# Patient Record
Sex: MALE | Race: WHITE | NOT HISPANIC OR LATINO | Employment: FULL TIME | URBAN - METROPOLITAN AREA
[De-identification: names, ages, dates, MRNs, and addresses within clinical notes are randomized per-mention and may not be internally consistent; named-entity substitution may affect disease eponyms.]

---

## 2020-04-23 ENCOUNTER — HOSPITAL ENCOUNTER (EMERGENCY)
Facility: MEDICAL CENTER | Age: 45
End: 2020-04-23
Attending: EMERGENCY MEDICINE

## 2020-04-23 VITALS
BODY MASS INDEX: 25.88 KG/M2 | OXYGEN SATURATION: 96 % | TEMPERATURE: 97.9 F | RESPIRATION RATE: 18 BRPM | SYSTOLIC BLOOD PRESSURE: 120 MMHG | HEART RATE: 60 BPM | DIASTOLIC BLOOD PRESSURE: 76 MMHG | WEIGHT: 180.78 LBS | HEIGHT: 70 IN

## 2020-04-23 DIAGNOSIS — H18.892 CORNEAL RUST RING OF LEFT EYE: ICD-10-CM

## 2020-04-23 DIAGNOSIS — T15.02XA FOREIGN BODY OF LEFT CORNEA, INITIAL ENCOUNTER: ICD-10-CM

## 2020-04-23 PROCEDURE — 700101 HCHG RX REV CODE 250: Performed by: EMERGENCY MEDICINE

## 2020-04-23 PROCEDURE — 99284 EMERGENCY DEPT VISIT MOD MDM: CPT

## 2020-04-23 PROCEDURE — 65222 REMOVE FOREIGN BODY FROM EYE: CPT

## 2020-04-23 RX ORDER — ERYTHROMYCIN 5 MG/G
OINTMENT OPHTHALMIC
Qty: 1 TUBE | Refills: 0 | Status: SHIPPED | OUTPATIENT
Start: 2020-04-23

## 2020-04-23 RX ORDER — ERYTHROMYCIN 5 MG/G
OINTMENT OPHTHALMIC ONCE
Status: COMPLETED | OUTPATIENT
Start: 2020-04-23 | End: 2020-04-23

## 2020-04-23 RX ORDER — PROPARACAINE HYDROCHLORIDE 5 MG/ML
2 SOLUTION/ DROPS OPHTHALMIC ONCE
Status: COMPLETED | OUTPATIENT
Start: 2020-04-23 | End: 2020-04-23

## 2020-04-23 RX ADMIN — PROPARACAINE HYDROCHLORIDE 2 DROP: 5 SOLUTION/ DROPS OPHTHALMIC at 20:45

## 2020-04-23 RX ADMIN — ERYTHROMYCIN: 5 OINTMENT OPHTHALMIC at 21:15

## 2020-04-23 RX ADMIN — FLUORESCEIN SODIUM 1 MG: 1 STRIP OPHTHALMIC at 20:45

## 2020-04-23 ASSESSMENT — LIFESTYLE VARIABLES
EVER HAD A DRINK FIRST THING IN THE MORNING TO STEADY YOUR NERVES TO GET RID OF A HANGOVER: NO
HAVE YOU EVER FELT YOU SHOULD CUT DOWN ON YOUR DRINKING: NO
TOTAL SCORE: 0
TOTAL SCORE: 0
EVER FELT BAD OR GUILTY ABOUT YOUR DRINKING: NO
HAVE PEOPLE ANNOYED YOU BY CRITICIZING YOUR DRINKING: NO
CONSUMPTION TOTAL: INCOMPLETE
DO YOU DRINK ALCOHOL: YES
TOTAL SCORE: 0

## 2020-04-24 NOTE — ED TRIAGE NOTES
Chief Complaint   Patient presents with   • Eye Injury     Left eye foriegn object, wearing eye protection but occured when touching eye, occured 4/22/20 while working with metal

## 2020-04-24 NOTE — ED NOTES
Visual acuity performed without correction  B- 20/25  Rt- 20/30  Lt- 20/25    Patient tolerated well.

## 2020-04-24 NOTE — DISCHARGE INSTRUCTIONS
The foreign object has been removed from your eye, but there is still a rust stain that should be removed by an eye doctor.  Please use the contact information given here to make an appointment as soon as you possibly can with Dr. Velez.  Until then, put a small amount of antibiotic ointment inside your lower eyelid, every 4 hours while you are awake, the way we demonstrated for you here.

## 2020-04-24 NOTE — ED PROVIDER NOTES
ED Provider Note    Scribed for Coy Anderson M.D. by Gurwinder Raman. 4/23/2020,  8:26 PM.    CHIEF COMPLAINT  Chief Complaint   Patient presents with   • Eye Injury     Left eye foriegn object, wearing eye protection but occured when touching eye, occured 4/22/20 while working with metal       [facility]  Archie Ramsey is a 45 y.o. male who presents to the Emergency Department for acute, worsening left eye pain and suspected foreign body onset 1 day ago. Patient states that yesterday he was cutting metal while wearing eye protection, and after he finished working on something, he rubbed his eye, then felt pain in his eye and rubbed them with his hands more, which he thinks that was not a good idea because his hands were covered in debris. He attempted to flush out his eye with water but the pain persisted.  He denies any high velocity impact or trauma or pain while he was actually working directly on the metal.  He does not wear contact lenses.  He does not feel his vision is affected, despite pain.    REVIEW OF SYSTEMS  See [facility] for further details. All other systems are negative.     PAST MEDICAL HISTORY   No diabetes or immunosuppression or corrective lenses.    SOCIAL HISTORY  Social History     Tobacco Use   • Smoking status: Current Some Day Smoker     Packs/day: 0.50     Types: Cigarettes   • Tobacco comment: quit 1 yr ago   Substance and Sexual Activity   • Alcohol use: Yes     Comment: occasionally   • Drug use: No   • Sexual activity: Not on file     Social History     Substance and Sexual Activity   Drug Use No       SURGICAL HISTORY   has a past surgical history that includes esophageal stent (10 years ago).    CURRENT MEDICATIONS  Home Medications     Reviewed by Edson Bourgeois R.N. (Registered Nurse) on 04/23/20 at 1941  Med List Status: Partial   Medication Last Dose Status   cephALEXin (KEFLEX) 500 MG CAPS  Active   hydrocodone-acetaminophen (NORCO) 5-325 MG TABS per tablet  Active           "      ALLERGIES  Allergies   Allergen Reactions   • Nkda [No Known Drug Allergy]        PHYSICAL EXAM  VITAL SIGNS: /80   Pulse 80   Temp 36.7 °C (98.1 °F)   Resp 16   Ht 1.778 m (5' 10\")   Wt 82 kg (180 lb 12.4 oz)   SpO2 100%   BMI 25.94 kg/m²   Pulse ox interpretation: I interpret this pulse ox as normal.  Constitutional: Alert in no apparent distress.  HENT: No signs of trauma, Bilateral external ears normal, Nose normal.   Eyes: Left eye fluorescein exam showed a small metallic foreign body in the cornea at 2 o clock position over the iris. No FB on lid eversion, no cell or flare on slit lamp exam. FB was removed, and rust ring remained after FB removal.   Neck: Normal range of motion, Supple, No stridor.    Cardiovascular: Normal peripheral perfusion  Thorax & Lungs: Unlabored respirations, equal chest expansion, no accessory muscle use  Abdomen: Non-distended  Skin:  No erythema, No rash.   Back: Normal alignment and ROM  Extremities: No gross deformity  Musculoskeletal: Good range of motion in all major joints.   Neurologic: Alert, Normal motor function, No focal deficits noted.   Psychiatric: Affect normal, Judgment normal, Mood normal.    Foreign Body Removal Procedure Note    Indication: Corneal foreign body    Procedure: Local anesthesia over the foreign body site was obtained by topical application of proparacaine.  The foreign body was then removed using the curved edge of a an 18-gauge needle and had the appearance of metal.  A persistent rust ring was noted, and the patient understands he needs to have this removed by ophthalmology.    The patient tolerated the procedure well.    Complications: None      COURSE & MEDICAL DECISION MAKING  Nursing notes, VS, PMSFHx reviewed in chart.     PPE Note: I verified that the patient was wearing a mask and I was wearing appropriate PPE every time I entered the room. The patient's mask was on the patient at all times during my encounter except for a " brief view of the oropharynx.     Scribe remained outside the patient's room and did not have any contact with the patient for the duration of patient encounter.       8:26 PM Patient seen and examined at bedside.  His exam reveals a corneal foreign body which will need to be removed.      8:32 PM Flourescein exam was done at this time with findings noted above. FB body was removed at this time as as noted above. Patient will be placed on erythromycin and discharged home in stable condition, with instructions to follow up with ophthalmology for rust ring removal.    9:04 PM just prior to discharge, I found that Dr. Velez was coming to see another patient in the department, and offered to let the patient stay to be seen here, rather than schedule an outpatient appointment.  The patient declined, preferring to make an outpatient appointment.    The patient will return for new or worsening symptoms and is stable at the time of discharge.    The patient is referred to a primary physician for blood pressure management, diabetic screening, and for all other preventative health concerns.    DISPOSITION:  Patient will be discharged home in stable condition.    FOLLOW UP:  Celso Velez M.D.  [address]  [place] [address]  [phone]    Call in 1 day        OUTPATIENT MEDICATIONS:  Discharge Medication List as of 4/23/2020  9:04 PM      START taking these medications    Details   erythromycin 5 MG/GM Ointment Apply 1/4 inch inside the lower eyelid every 4 hours for 5 days or until told to stop by ophthalmology., Disp-1 Tube, R-0, Print Rx Paper               FINAL IMPRESSION  1. Foreign body of left cornea, initial encounter    2. Corneal rust ring of left eye         Gurwinder HERNANDEZ), am scribing for, and in the presence of, Coy Anderson M.D..    Electronically signed by: Gurwinder Clinton), 4/23/2020    Coy HERNANDEZ M.D. personally performed the services described in this  documentation, as scribed by Gurwinder Raman in my presence, and it is both accurate and complete. C.    The note accurately reflects work and decisions made by me.  Coy Anderson M.D.  4/23/2020  10:35 PM

## 2020-04-24 NOTE — ED NOTES
Pt medicated per MAR. Pt discharged. Pt provided information about foreign body in the eye. Pt educated to follow-up w/ optometrist asap and to return to the hospital w/ any worsening symptoms. Pt provided prescription and walked to lobby.